# Patient Record
Sex: MALE | Race: BLACK OR AFRICAN AMERICAN | Employment: FULL TIME | URBAN - METROPOLITAN AREA
[De-identification: names, ages, dates, MRNs, and addresses within clinical notes are randomized per-mention and may not be internally consistent; named-entity substitution may affect disease eponyms.]

---

## 2019-04-30 ENCOUNTER — APPOINTMENT (OUTPATIENT)
Dept: GENERAL RADIOLOGY | Age: 34
End: 2019-04-30
Payer: COMMERCIAL

## 2019-04-30 ENCOUNTER — HOSPITAL ENCOUNTER (EMERGENCY)
Age: 34
Discharge: HOME | End: 2019-04-30
Attending: EMERGENCY MEDICINE
Payer: COMMERCIAL

## 2019-04-30 VITALS
BODY MASS INDEX: 49.44 KG/M2 | HEIGHT: 67 IN | OXYGEN SATURATION: 100 % | RESPIRATION RATE: 16 BRPM | SYSTOLIC BLOOD PRESSURE: 135 MMHG | DIASTOLIC BLOOD PRESSURE: 80 MMHG | TEMPERATURE: 98.1 F | HEART RATE: 78 BPM | WEIGHT: 315 LBS

## 2019-04-30 DIAGNOSIS — M25.512 ACUTE PAIN OF LEFT SHOULDER: ICD-10-CM
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
## 2019-04-30 PROCEDURE — 99283 EMERGENCY DEPT VISIT LOW MDM: CPT | Performed by: PHYSICIAN ASSISTANT

## 2019-04-30 PROCEDURE — 74011250637 HC RX REV CODE- 250/637: Performed by: PHYSICIAN ASSISTANT

## 2019-04-30 PROCEDURE — 73030 X-RAY EXAM OF SHOULDER: CPT

## 2019-04-30 RX ORDER — IBUPROFEN 600 MG/1
600 TABLET ORAL
Qty: 20 TAB | Refills: 0 | Status: SHIPPED | OUTPATIENT
Start: 2019-04-30

## 2019-04-30 RX ORDER — CYCLOBENZAPRINE HCL 10 MG
5 TABLET ORAL
Status: COMPLETED | OUTPATIENT
Start: 2019-04-30 | End: 2019-04-30

## 2019-04-30 RX ORDER — CYCLOBENZAPRINE HCL 5 MG
5 TABLET ORAL
Qty: 20 TAB | Refills: 0 | Status: SHIPPED | OUTPATIENT
Start: 2019-04-30

## 2019-04-30 RX ORDER — HYDROCODONE BITARTRATE AND ACETAMINOPHEN 7.5; 325 MG/1; MG/1
1 TABLET ORAL
Status: COMPLETED | OUTPATIENT
Start: 2019-04-30 | End: 2019-04-30

## 2019-04-30 RX ADMIN — CYCLOBENZAPRINE HYDROCHLORIDE 5 MG: 10 TABLET, FILM COATED ORAL at 18:38

## 2019-04-30 RX ADMIN — HYDROCODONE BITARTRATE AND ACETAMINOPHEN 1 TABLET: 7.5; 325 TABLET ORAL at 18:38

## 2019-04-30 NOTE — DISCHARGE INSTRUCTIONS
Patient Education        Shoulder Sprain: Care Instructions  Your Care Instructions    A shoulder sprain occurs when you stretch or tear a ligament in your shoulder. Ligaments are tough tissues that connect one bone to another. A sprain can happen during sports, a fall, or projects around the house. Shoulder sprains usually get better with treatment at home. Follow-up care is a key part of your treatment and safety. Be sure to make and go to all appointments, and call your doctor if you are having problems. It's also a good idea to know your test results and keep a list of the medicines you take. How can you care for yourself at home? · Rest and protect your shoulder. Try to stop or reduce any action that causes pain. · If your doctor gave you a sling or immobilizer, wear it as directed. A sling or immobilizer supports your shoulder and may make you more comfortable. · Put ice or a cold pack on your shoulder for 10 to 20 minutes at a time. Try to do this every 1 to 2 hours for the next 3 days (when you are awake) or until the swelling goes down. Put a thin cloth between the ice and your skin. Some doctors suggest alternating between hot and cold. · Be safe with medicines. Read and follow all instructions on the label. ? If the doctor gave you a prescription medicine for pain, take it as prescribed. ? If you are not taking a prescription pain medicine, ask your doctor if you can take an over-the-counter medicine. · For the first day or two after an injury, avoid things that might increase swelling, such as hot showers, hot tubs, or hot packs. · After 2 or 3 days, if your swelling is gone, apply a heating pad set on low or a warm cloth to your shoulder. This helps keep your shoulder flexible. Some doctors suggest that you go back and forth between hot and cold. Put a thin cloth between the heating pad and your skin. · Follow your doctor's or physical therapist's directions for exercises.   · Return to your usual level of activity slowly. When should you call for help? Call your doctor now or seek immediate medical care if:    · Your pain is worse.     · You cannot move your shoulder.     · Your arm is cool or pale or changes color below the shoulder.     · You have tingling, weakness, or numbness in your arm.    Watch closely for changes in your health, and be sure to contact your doctor if:    · You do not get better as expected. Where can you learn more? Go to http://christi-sylwia.info/. Enter C677 in the search box to learn more about \"Shoulder Sprain: Care Instructions. \"  Current as of: September 20, 2018  Content Version: 11.9  © 1164-2316 [employer]. Care instructions adapted under license by [employer] (which disclaims liability or warranty for this information). If you have questions about a medical condition or this instruction, always ask your healthcare professional. [place] [address] any warranty or liability for your use of this information. Patient Education        Shoulder Stretches: Exercises  Your Care Instructions  Here are some examples of exercises for your shoulder. Start each exercise slowly. Ease off the exercise if you start to have pain. Your doctor or physical therapist will tell you when you can start these exercises and which ones will work best for you. How to do the exercises  Shoulder stretch    1.  a doorway and place one arm against the door frame. Your elbow should be a little higher than your shoulder. 2. Relax your shoulders as you lean forward, allowing your chest and shoulder muscles to stretch. You can also turn your body slightly away from your arm to stretch the muscles even more. 3. Hold for 15 to 30 seconds. 4. Repeat 2 to 4 times with each arm. Shoulder and chest stretch    1.  Shoulder and chest stretch  2. While sitting, relax your upper body so you slump slightly in your chair.  3. As you breathe in, straighten your back and open your arms out to the sides. 4. Gently pull your shoulder blades back and downward. 5. Hold for 15 to 30 seconds as your breathe normally. 6. Repeat 2 to 4 times. Overhead stretch    1. Reach up over your head with both arms. 2. Hold for 15 to 30 seconds. 3. Repeat 2 to 4 times. Follow-up care is a key part of your treatment and safety. Be sure to make and go to all appointments, and call your doctor if you are having problems. It's also a good idea to know your test results and keep a list of the medicines you take. Where can you learn more? Go to http://christi-sylwia.info/. Enter S254 in the search box to learn more about \"Shoulder Stretches: Exercises. \"  Current as of: September 20, 2018  Content Version: 11.9  © 3519-2803 [employer]. Care instructions adapted under license by [employer] (which disclaims liability or warranty for this information). If you have questions about a medical condition or this instruction, always ask your healthcare professional. [place] [address] any warranty or liability for your use of this information.

## 2019-04-30 NOTE — ED NOTES
I have reviewed discharge instructions with the patient. The patient verbalized understanding. Patient left ED via Discharge Method: ambulatory to Home with self. Opportunity for questions and clarification provided. Patient given 2 scripts. To continue your aftercare when you leave the hospital, you may receive an automated call from our care team to check in on how you are doing. This is a free service and part of our promise to provide the best care and service to meet your aftercare needs.  If you have questions, or wish to unsubscribe from this service please call [phone]. Thank you for Choosing our [facility] Emergency Department.

## 2019-04-30 NOTE — LETTER
[facility] EMERGENCY DEPT 
[address] [place], [place] [id number] [phone] Work/School Note Date: 4/30/2019 To Whom It May concern: 
 
Tristan Calixto was seen and treated today in the emergency room by the following provider(s): 
Attending Provider: Barrie Lesch, MD 
Physician Assistant: TWILA Cisneros. Tristan Calixto may return to work on 05/03/19. Sincerely, TWILA Pierre
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
## 2020-08-08 ENCOUNTER — HOSPITAL ENCOUNTER (EMERGENCY)
Age: 35
Discharge: HOME | End: 2020-08-09
Attending: EMERGENCY MEDICINE
Payer: COMMERCIAL

## 2020-08-08 DIAGNOSIS — T78.40XA ALLERGIC REACTION, INITIAL ENCOUNTER: Primary | ICD-10-CM

## 2020-08-08 DIAGNOSIS — T78.00XA: ICD-10-CM

## 2020-08-08 PROCEDURE — 74011250636 HC RX REV CODE- 250/636: Performed by: EMERGENCY MEDICINE

## 2020-08-08 PROCEDURE — 96372 THER/PROPH/DIAG INJ SC/IM: CPT

## 2020-08-08 PROCEDURE — 74011000250 HC RX REV CODE- 250: Performed by: EMERGENCY MEDICINE

## 2020-08-08 PROCEDURE — 96374 THER/PROPH/DIAG INJ IV PUSH: CPT

## 2020-08-08 PROCEDURE — 96375 TX/PRO/DX INJ NEW DRUG ADDON: CPT

## 2020-08-08 PROCEDURE — 99283 EMERGENCY DEPT VISIT LOW MDM: CPT

## 2020-08-08 PROCEDURE — 93005 ELECTROCARDIOGRAM TRACING: CPT | Performed by: EMERGENCY MEDICINE

## 2020-08-08 RX ORDER — EPINEPHRINE 0.3 MG/.3ML
0.3 INJECTION SUBCUTANEOUS
Qty: 0.3 ML | Refills: 0 | Status: SHIPPED | OUTPATIENT
Start: 2020-08-08 | End: 2020-08-08

## 2020-08-08 RX ORDER — PREDNISONE 20 MG/1
60 TABLET ORAL DAILY
Qty: 9 TAB | Refills: 0 | Status: SHIPPED | OUTPATIENT
Start: 2020-08-08 | End: 2020-08-11

## 2020-08-08 RX ORDER — DEXAMETHASONE SODIUM PHOSPHATE 100 MG/10ML
10 INJECTION INTRAMUSCULAR; INTRAVENOUS
Status: COMPLETED | OUTPATIENT
Start: 2020-08-08 | End: 2020-08-08

## 2020-08-08 RX ORDER — EPINEPHRINE 1 MG/ML
0.3 INJECTION, SOLUTION, CONCENTRATE INTRAVENOUS ONCE
Status: COMPLETED | OUTPATIENT
Start: 2020-08-08 | End: 2020-08-08

## 2020-08-08 RX ORDER — DIPHENHYDRAMINE HYDROCHLORIDE 50 MG/ML
50 INJECTION, SOLUTION INTRAMUSCULAR; INTRAVENOUS
Status: COMPLETED | OUTPATIENT
Start: 2020-08-08 | End: 2020-08-08

## 2020-08-08 RX ADMIN — DIPHENHYDRAMINE HYDROCHLORIDE 50 MG: 50 INJECTION, SOLUTION INTRAMUSCULAR; INTRAVENOUS at 21:19

## 2020-08-08 RX ADMIN — FAMOTIDINE 20 MG: 10 INJECTION, SOLUTION INTRAVENOUS at 21:19

## 2020-08-08 RX ADMIN — METHYLPREDNISOLONE SODIUM SUCCINATE 125 MG: 125 INJECTION, POWDER, FOR SOLUTION INTRAMUSCULAR; INTRAVENOUS at 23:56

## 2020-08-08 RX ADMIN — SODIUM CHLORIDE 1000 ML: 900 INJECTION, SOLUTION INTRAVENOUS at 21:19

## 2020-08-08 RX ADMIN — EPINEPHRINE 0.3 MG: 1 INJECTION, SOLUTION, CONCENTRATE INTRAVENOUS at 21:23

## 2020-08-08 RX ADMIN — DEXAMETHASONE SODIUM PHOSPHATE 10 MG: 10 INJECTION INTRAMUSCULAR; INTRAVENOUS at 21:19

## 2020-08-09 VITALS
SYSTOLIC BLOOD PRESSURE: 154 MMHG | DIASTOLIC BLOOD PRESSURE: 88 MMHG | BODY MASS INDEX: 49.44 KG/M2 | WEIGHT: 315 LBS | OXYGEN SATURATION: 98 % | TEMPERATURE: 98.8 F | HEIGHT: 67 IN | HEART RATE: 70 BPM | RESPIRATION RATE: 16 BRPM

## 2020-08-09 LAB
ATRIAL RATE: 112 BPM
ATRIAL RATE: 97 BPM
CALCULATED P AXIS, ECG09: 44 DEGREES
CALCULATED P AXIS, ECG09: 55 DEGREES
CALCULATED R AXIS, ECG10: 58 DEGREES
CALCULATED R AXIS, ECG10: 67 DEGREES
CALCULATED T AXIS, ECG11: 46 DEGREES
CALCULATED T AXIS, ECG11: 63 DEGREES
DIAGNOSIS, 93000: NORMAL
DIAGNOSIS, 93000: NORMAL
P-R INTERVAL, ECG05: 146 MS
P-R INTERVAL, ECG05: 176 MS
Q-T INTERVAL, ECG07: 340 MS
Q-T INTERVAL, ECG07: 346 MS
QRS DURATION, ECG06: 78 MS
QRS DURATION, ECG06: 96 MS
QTC CALCULATION (BEZET), ECG08: 431 MS
QTC CALCULATION (BEZET), ECG08: 472 MS
VENTRICULAR RATE, ECG03: 112 BPM
VENTRICULAR RATE, ECG03: 97 BPM

## 2020-08-09 NOTE — ED NOTES
I have reviewed discharge instructions with the patient. The patient verbalized understanding. Patient left ED via Discharge Method: ambulatory to Home with wife. Opportunity for questions and clarification provided. Patient given 2 scripts. To continue your aftercare when you leave the hospital, you may receive an automated call from our care team to check in on how you are doing. This is a free service and part of our promise to provide the best care and service to meet your aftercare needs.  If you have questions, or wish to unsubscribe from this service please call [phone]. Thank you for Choosing our [facility] Emergency Department.

## 2020-08-09 NOTE — DISCHARGE INSTRUCTIONS
Avoid shellfish. Use EpiPen for allergic reaction that causes difficulty swallowing or difficulty breathing. Take 25 to 50 mg Benadryl every 6 hours as needed for itching.

## 2020-08-09 NOTE — ED TRIAGE NOTES
Arrives with face mask in place. Reports possible allergic reaction to crab legs, onset 30mins pta and 30mins after eating crab legs. Reports itching all over, whelts noted. Reports swelling to throat. Denies trouble breathing on arrival, sats 99% RA. Speaking in full sentences, tolerating oral secretions. Reports similar reaction couple years ago after eating shellfish. Denies other allergies. Attempted liquid benadryl pta however states spit back out. Also reports seen at dentist today for dental pain to right lower, prescribed tylenol with codeine. Reports taking approx 30mins-1 hour prior to eating crab legs. Denies ever taking tylenol with codeine before.

## 2020-08-09 NOTE — ED PROVIDER NOTES
26-year-old -American male presents to the emergency department with complaints of allergic reaction. Patient was eating crab legs tonight and then about 30 minutes afterwards he started having itching all over, facial swelling, difficulty swallowing so he came directly to the emergency department. Prior to coming, he tried to drink liquid Benadryl but he was unable to swallow it and spit it back out. He states that he did have a mild allergic reaction a few years ago to shellfish but it was only hives then. Patient states that he is having some difficulty swallowing and that his throat feels tight. He also feels like his neck is tongue is a little bit swollen. Patient denies any difficulty breathing, chest pain, fever, abdominal pain, changes in bowel bladder habits or other concerns. No past medical history on file. No past surgical history on file. No family history on file.     Social History     Socioeconomic History    Marital status:      Spouse name: Not on file    Number of children: Not on file    Years of education: Not on file    Highest education level: Not on file   Occupational History    Not on file   Social Needs    Financial resource strain: Not on file    Food insecurity     Worry: Not on file     Inability: Not on file    Transportation needs     Medical: Not on file     Non-medical: Not on file   Tobacco Use    Smoking status: Current Some Day Smoker    Smokeless tobacco: Never Used   Substance and Sexual Activity    Alcohol use: Never     Frequency: Never    Drug use: Never    Sexual activity: Not on file   Lifestyle    Physical activity     Days per week: Not on file     Minutes per session: Not on file    Stress: Not on file   Relationships    Social connections     Talks on phone: Not on file     Gets together: Not on file     Attends Anabaptist service: Not on file     Active member of club or organization: Not on file     Attends meetings of clubs or organizations: Not on file     Relationship status: Not on file    Intimate partner violence     Fear of current or ex partner: Not on file     Emotionally abused: Not on file     Physically abused: Not on file     Forced sexual activity: Not on file   Other Topics Concern    Not on file   Social History Narrative    Not on file         ALLERGIES: Patient has no known allergies. Review of Systems   Constitutional: Negative. HENT: Positive for facial swelling, trouble swallowing and voice change. Eyes:        Eye swelling   Respiratory: Negative. Cardiovascular: Negative. Gastrointestinal: Negative. Endocrine: Negative. Genitourinary: Negative. Musculoskeletal: Negative. Skin: Positive for rash (Hives). Allergic/Immunologic: Positive for food allergies. Neurological: Negative. Psychiatric/Behavioral: Negative. All other systems reviewed and are negative. Vitals:    08/08/20 2108 08/08/20 2143   BP: (!) 167/99    Pulse: (!) 107    Resp: 16    Temp: 99 °F (37.2 °C)    SpO2: 99% 99%   Weight: (!) 181.4 kg (400 lb)    Height: 5' 7\" (1.702 m)             Physical Exam  Vitals signs and nursing note reviewed. Constitutional:       General: He is in acute distress (Significant allergic reaction). Appearance: He is obese. HENT:      Head: Atraumatic. Mouth/Throat:      Mouth: Mucous membranes are moist.      Comments: Significant lip swelling. Mild posterior pharynx edema. No difficulty handling secretions. Eyes:      Comments: Significant periorbital edema   Cardiovascular:      Rate and Rhythm: Regular rhythm. Tachycardia present. Pulses: Normal pulses. Heart sounds: Normal heart sounds. Pulmonary:      Effort: Pulmonary effort is normal. No respiratory distress. Breath sounds: Normal breath sounds. No wheezing. Abdominal:      Palpations: Abdomen is soft. Musculoskeletal:         General: No swelling.    Skin:     General: Skin is warm and dry. Findings: Rash (Diffuse urticaria) present. Neurological:      General: No focal deficit present. Mental Status: He is alert and oriented to person, place, and time. Psychiatric:         Mood and Affect: Mood normal.         Behavior: Behavior normal.          MDM  Number of Diagnoses or Management Options  Diagnosis management comments: Significant allergic reaction from shellfish. Patient stable in the emergency department. Patient receiving epi, Decadron, Benadryl, Pepcid in the emergency department. ED Course as of Aug 09 0117   Sat Aug 08, 2020   2327 Patient significantly improved. Patient will continue to be observed until 1am.  He will be re-evaluated prior to dc. [JL]   9271 I have assumed care of this patient from Dr. Basil Walter. Evaluated the patient he states he feels like he is getting better. We will continue to monitor the patient for another 2 hours and if no worsening of symptoms can be discharged home with prednisone and clear return precautions    [JS]   Sun Aug 09, 2020   0116 Reevaluated the patient and his symptoms continue to improve. Of note upon entering the room patient was snoring and his O2 sats were in the 80s. I recommended to him that he establish with a primary care doctor as he probably needs a sleep study and he likely has sleep apnea.     [JS]      ED Course User Index  [JL] Nicole Barrow DO  [JS] Judge Juana MD

## 2022-01-12 ENCOUNTER — HOSPITAL ENCOUNTER (OUTPATIENT)
Dept: SLEEP MEDICINE | Age: 37
Discharge: HOME | End: 2022-01-12
Payer: COMMERCIAL

## 2022-01-12 PROCEDURE — 95806 SLEEP STUDY UNATT&RESP EFFT: CPT

## 2022-01-28 PROBLEM — E66.01 MORBID OBESITY (HCC): Status: ACTIVE | Noted: 2022-01-28

## 2022-01-28 PROBLEM — R03.0 ELEVATED BLOOD PRESSURE READING: Status: ACTIVE | Noted: 2022-01-28

## 2022-01-28 PROBLEM — G47.33 OSA (OBSTRUCTIVE SLEEP APNEA): Status: ACTIVE | Noted: 2022-01-28
